# Patient Record
Sex: FEMALE | Race: WHITE | NOT HISPANIC OR LATINO | Employment: OTHER | URBAN - METROPOLITAN AREA
[De-identification: names, ages, dates, MRNs, and addresses within clinical notes are randomized per-mention and may not be internally consistent; named-entity substitution may affect disease eponyms.]

---

## 2017-03-07 PROBLEM — E03.4 HYPOTHYROIDISM DUE TO ACQUIRED ATROPHY OF THYROID: Status: ACTIVE | Noted: 2017-03-07

## 2017-03-07 PROBLEM — M85.80 OSTEOPENIA: Status: ACTIVE | Noted: 2017-03-07

## 2019-06-13 PROBLEM — E66.811 CLASS 1 OBESITY DUE TO EXCESS CALORIES WITH SERIOUS COMORBIDITY AND BODY MASS INDEX (BMI) OF 30.0 TO 30.9 IN ADULT: Status: ACTIVE | Noted: 2019-06-13

## 2019-06-13 PROBLEM — I10 ESSENTIAL HYPERTENSION: Status: ACTIVE | Noted: 2019-06-13

## 2019-06-13 PROBLEM — E66.09 CLASS 1 OBESITY DUE TO EXCESS CALORIES WITH SERIOUS COMORBIDITY AND BODY MASS INDEX (BMI) OF 30.0 TO 30.9 IN ADULT: Status: ACTIVE | Noted: 2019-06-13

## 2021-08-03 PROBLEM — D61.818 PANCYTOPENIA: Status: ACTIVE | Noted: 2021-08-03

## 2021-08-25 ENCOUNTER — OFFICE VISIT (OUTPATIENT)
Dept: URGENT CARE | Facility: CLINIC | Age: 71
End: 2021-08-25
Payer: MEDICARE

## 2021-08-25 VITALS
TEMPERATURE: 98 F | HEIGHT: 68 IN | DIASTOLIC BLOOD PRESSURE: 77 MMHG | HEART RATE: 54 BPM | BODY MASS INDEX: 31.32 KG/M2 | OXYGEN SATURATION: 98 % | SYSTOLIC BLOOD PRESSURE: 140 MMHG | RESPIRATION RATE: 18 BRPM | WEIGHT: 206.63 LBS

## 2021-08-25 DIAGNOSIS — M25.531 RIGHT WRIST PAIN: Primary | ICD-10-CM

## 2021-08-25 PROCEDURE — 3077F PR MOST RECENT SYSTOLIC BLOOD PRESSURE >= 140 MM HG: ICD-10-PCS | Mod: CPTII,S$GLB,, | Performed by: NURSE PRACTITIONER

## 2021-08-25 PROCEDURE — 3008F PR BODY MASS INDEX (BMI) DOCUMENTED: ICD-10-PCS | Mod: CPTII,S$GLB,, | Performed by: NURSE PRACTITIONER

## 2021-08-25 PROCEDURE — 3078F PR MOST RECENT DIASTOLIC BLOOD PRESSURE < 80 MM HG: ICD-10-PCS | Mod: CPTII,S$GLB,, | Performed by: NURSE PRACTITIONER

## 2021-08-25 PROCEDURE — 3008F BODY MASS INDEX DOCD: CPT | Mod: CPTII,S$GLB,, | Performed by: NURSE PRACTITIONER

## 2021-08-25 PROCEDURE — 3078F DIAST BP <80 MM HG: CPT | Mod: CPTII,S$GLB,, | Performed by: NURSE PRACTITIONER

## 2021-08-25 PROCEDURE — 1159F MED LIST DOCD IN RCRD: CPT | Mod: CPTII,S$GLB,, | Performed by: NURSE PRACTITIONER

## 2021-08-25 PROCEDURE — 73110 XR WRIST COMPLETE 3 VIEWS RIGHT: ICD-10-PCS | Mod: RT,S$GLB,, | Performed by: RADIOLOGY

## 2021-08-25 PROCEDURE — 3077F SYST BP >= 140 MM HG: CPT | Mod: CPTII,S$GLB,, | Performed by: NURSE PRACTITIONER

## 2021-08-25 PROCEDURE — 73110 X-RAY EXAM OF WRIST: CPT | Mod: RT,S$GLB,, | Performed by: RADIOLOGY

## 2021-08-25 PROCEDURE — 99203 OFFICE O/P NEW LOW 30 MIN: CPT | Mod: S$GLB,,, | Performed by: NURSE PRACTITIONER

## 2021-08-25 PROCEDURE — 1159F PR MEDICATION LIST DOCUMENTED IN MEDICAL RECORD: ICD-10-PCS | Mod: CPTII,S$GLB,, | Performed by: NURSE PRACTITIONER

## 2021-08-25 PROCEDURE — 99203 PR OFFICE/OUTPT VISIT, NEW, LEVL III, 30-44 MIN: ICD-10-PCS | Mod: S$GLB,,, | Performed by: NURSE PRACTITIONER

## 2021-08-25 RX ORDER — MELOXICAM 15 MG/1
15 TABLET ORAL DAILY
COMMUNITY
End: 2023-09-18 | Stop reason: SDUPTHER

## 2021-12-31 ENCOUNTER — HOSPITAL ENCOUNTER (EMERGENCY)
Facility: HOSPITAL | Age: 71
Discharge: HOME | End: 2021-12-31
Attending: EMERGENCY MEDICINE
Payer: MEDICARE

## 2021-12-31 VITALS
WEIGHT: 200 LBS | BODY MASS INDEX: 30.31 KG/M2 | RESPIRATION RATE: 29 BRPM | HEART RATE: 56 BPM | OXYGEN SATURATION: 97 % | SYSTOLIC BLOOD PRESSURE: 148 MMHG | TEMPERATURE: 98 F | DIASTOLIC BLOOD PRESSURE: 66 MMHG | HEIGHT: 68 IN

## 2021-12-31 DIAGNOSIS — R42 DIZZINESS: Primary | ICD-10-CM

## 2021-12-31 DIAGNOSIS — N39.0 URINARY TRACT INFECTION WITHOUT HEMATURIA, SITE UNSPECIFIED: ICD-10-CM

## 2021-12-31 LAB
ALBUMIN SERPL BCP-MCNC: 3.8 G/DL (ref 3.5–5.2)
ALP SERPL-CCNC: 71 U/L (ref 55–135)
ALT SERPL W/O P-5'-P-CCNC: 20 U/L (ref 10–44)
ANION GAP SERPL CALC-SCNC: 10 MMOL/L (ref 8–16)
AST SERPL-CCNC: 29 U/L (ref 10–40)
BACTERIA #/AREA URNS HPF: ABNORMAL /HPF
BASOPHILS # BLD AUTO: 0.05 K/UL (ref 0–0.2)
BASOPHILS NFR BLD: 0.9 % (ref 0–1.9)
BILIRUB SERPL-MCNC: 0.8 MG/DL (ref 0.1–1)
BILIRUB UR QL STRIP: NEGATIVE
BUN SERPL-MCNC: 12 MG/DL (ref 8–23)
CALCIUM SERPL-MCNC: 9 MG/DL (ref 8.7–10.5)
CHLORIDE SERPL-SCNC: 103 MMOL/L (ref 95–110)
CLARITY UR: CLEAR
CO2 SERPL-SCNC: 25 MMOL/L (ref 23–29)
COLOR UR: YELLOW
CREAT SERPL-MCNC: 0.9 MG/DL (ref 0.5–1.4)
DIFFERENTIAL METHOD: ABNORMAL
EOSINOPHIL # BLD AUTO: 0.1 K/UL (ref 0–0.5)
EOSINOPHIL NFR BLD: 2.4 % (ref 0–8)
ERYTHROCYTE [DISTWIDTH] IN BLOOD BY AUTOMATED COUNT: 14.8 % (ref 11.5–14.5)
EST. GFR  (AFRICAN AMERICAN): >60 ML/MIN/1.73 M^2
EST. GFR  (NON AFRICAN AMERICAN): >60 ML/MIN/1.73 M^2
GLUCOSE SERPL-MCNC: 94 MG/DL (ref 70–110)
GLUCOSE UR QL STRIP: NEGATIVE
HCT VFR BLD AUTO: 36.2 % (ref 37–48.5)
HGB BLD-MCNC: 11.2 G/DL (ref 12–16)
HGB UR QL STRIP: NEGATIVE
HYALINE CASTS #/AREA URNS LPF: 13 /LPF
IMM GRANULOCYTES # BLD AUTO: 0.01 K/UL (ref 0–0.04)
IMM GRANULOCYTES NFR BLD AUTO: 0.2 % (ref 0–0.5)
KETONES UR QL STRIP: NEGATIVE
LEUKOCYTE ESTERASE UR QL STRIP: ABNORMAL
LYMPHOCYTES # BLD AUTO: 2 K/UL (ref 1–4.8)
LYMPHOCYTES NFR BLD: 37.4 % (ref 18–48)
MCH RBC QN AUTO: 26.3 PG (ref 27–31)
MCHC RBC AUTO-ENTMCNC: 30.9 G/DL (ref 32–36)
MCV RBC AUTO: 85 FL (ref 82–98)
MICROSCOPIC COMMENT: ABNORMAL
MONOCYTES # BLD AUTO: 0.4 K/UL (ref 0.3–1)
MONOCYTES NFR BLD: 8.2 % (ref 4–15)
NEUTROPHILS # BLD AUTO: 2.7 K/UL (ref 1.8–7.7)
NEUTROPHILS NFR BLD: 50.9 % (ref 38–73)
NITRITE UR QL STRIP: NEGATIVE
NRBC BLD-RTO: 0 /100 WBC
PH UR STRIP: 8 [PH] (ref 5–8)
PLATELET # BLD AUTO: 238 K/UL (ref 150–450)
PMV BLD AUTO: 9.3 FL (ref 9.2–12.9)
POTASSIUM SERPL-SCNC: 4 MMOL/L (ref 3.5–5.1)
PROT SERPL-MCNC: 6.8 G/DL (ref 6–8.4)
PROT UR QL STRIP: NEGATIVE
RBC # BLD AUTO: 4.26 M/UL (ref 4–5.4)
RBC #/AREA URNS HPF: 0 /HPF (ref 0–4)
SARS-COV-2 RDRP RESP QL NAA+PROBE: NEGATIVE
SODIUM SERPL-SCNC: 138 MMOL/L (ref 136–145)
SP GR UR STRIP: 1.01 (ref 1–1.03)
SQUAMOUS #/AREA URNS HPF: 9 /HPF
TROPONIN I SERPL DL<=0.01 NG/ML-MCNC: <0.03 NG/ML
TSH SERPL DL<=0.005 MIU/L-ACNC: 0.78 UIU/ML (ref 0.34–5.6)
URN SPEC COLLECT METH UR: ABNORMAL
UROBILINOGEN UR STRIP-ACNC: NEGATIVE EU/DL
WBC # BLD AUTO: 5.35 K/UL (ref 3.9–12.7)
WBC #/AREA URNS HPF: 14 /HPF (ref 0–5)

## 2021-12-31 PROCEDURE — 25000003 PHARM REV CODE 250: Performed by: EMERGENCY MEDICINE

## 2021-12-31 PROCEDURE — 36000 PLACE NEEDLE IN VEIN: CPT

## 2021-12-31 PROCEDURE — 80053 COMPREHEN METABOLIC PANEL: CPT | Performed by: EMERGENCY MEDICINE

## 2021-12-31 PROCEDURE — 85025 COMPLETE CBC W/AUTO DIFF WBC: CPT | Performed by: EMERGENCY MEDICINE

## 2021-12-31 PROCEDURE — 84443 ASSAY THYROID STIM HORMONE: CPT | Performed by: EMERGENCY MEDICINE

## 2021-12-31 PROCEDURE — U0002 COVID-19 LAB TEST NON-CDC: HCPCS | Performed by: EMERGENCY MEDICINE

## 2021-12-31 PROCEDURE — 87086 URINE CULTURE/COLONY COUNT: CPT | Performed by: EMERGENCY MEDICINE

## 2021-12-31 PROCEDURE — 99284 EMERGENCY DEPT VISIT MOD MDM: CPT | Mod: 25

## 2021-12-31 PROCEDURE — 84484 ASSAY OF TROPONIN QUANT: CPT | Performed by: EMERGENCY MEDICINE

## 2021-12-31 PROCEDURE — 81001 URINALYSIS AUTO W/SCOPE: CPT | Performed by: EMERGENCY MEDICINE

## 2021-12-31 RX ORDER — MECLIZINE HCL 12.5 MG 12.5 MG/1
25 TABLET ORAL
Status: COMPLETED | OUTPATIENT
Start: 2021-12-31 | End: 2021-12-31

## 2021-12-31 RX ORDER — AMOXICILLIN AND CLAVULANATE POTASSIUM 875; 125 MG/1; MG/1
1 TABLET, FILM COATED ORAL 2 TIMES DAILY
Qty: 14 TABLET | Refills: 0 | Status: SHIPPED | OUTPATIENT
Start: 2021-12-31 | End: 2022-08-29

## 2021-12-31 RX ORDER — MECLIZINE HYDROCHLORIDE 25 MG/1
25 TABLET ORAL 3 TIMES DAILY PRN
Qty: 20 TABLET | Refills: 0 | Status: SHIPPED | OUTPATIENT
Start: 2021-12-31 | End: 2023-09-18

## 2021-12-31 RX ORDER — ACETAMINOPHEN 500 MG
5000 TABLET ORAL DAILY
COMMUNITY

## 2021-12-31 RX ADMIN — SODIUM CHLORIDE 1000 ML: 0.9 INJECTION, SOLUTION INTRAVENOUS at 04:12

## 2021-12-31 RX ADMIN — MECLIZINE HYDROCHLORIDE 25 MG: 12.5 TABLET ORAL at 04:12

## 2021-12-31 NOTE — ED PROVIDER NOTES
Encounter Date: 12/31/2021       History     Chief Complaint   Patient presents with    Dizziness     Dizziness today w/ nausea.       71-year-old female presents complaining of dizziness patient reports that she has had intermittent dizziness starting today patient reports that dizziness is when standing she reports no dizziness while sitting down patient reports no dizziness when turning her head patient reports no current dizziness.  Patient denies weakness numbness headache patient denies ear pain patient has no other acute complaints at this time        Review of patient's allergies indicates:  No Known Allergies  Past Medical History:   Diagnosis Date    Thyroid nodule      Past Surgical History:   Procedure Laterality Date    GASTRIC BYPASS      HYSTERECTOMY  1984    partial    THYROIDECTOMY, PARTIAL       Family History   Problem Relation Age of Onset    Cancer Mother         breast    Breast cancer Mother 73    Cancer Father     Cirrhosis Father     Cancer Sister         lymph node    Heart disease Brother      Social History     Tobacco Use    Smoking status: Never Smoker    Smokeless tobacco: Never Used   Substance Use Topics    Drug use: No     Review of Systems   Constitutional: Negative for fever.   HENT: Negative for congestion, rhinorrhea, sore throat and trouble swallowing.    Eyes: Negative for visual disturbance.   Respiratory: Negative for cough, chest tightness, shortness of breath and wheezing.    Cardiovascular: Negative for chest pain, palpitations and leg swelling.   Gastrointestinal: Negative for abdominal distention, abdominal pain, constipation, diarrhea, nausea and vomiting.   Genitourinary: Negative for difficulty urinating, dysuria, flank pain and frequency.   Musculoskeletal: Negative for arthralgias, back pain, joint swelling and neck pain.   Skin: Negative for color change and rash.   Neurological: Positive for dizziness. Negative for syncope, speech difficulty,  weakness, numbness and headaches.   All other systems reviewed and are negative.      Physical Exam     Initial Vitals [12/31/21 1431]   BP Pulse Resp Temp SpO2   (!) 169/84 64 18 98 °F (36.7 °C) 98 %      MAP       --         Physical Exam    Nursing note and vitals reviewed.  Constitutional: She appears well-developed and well-nourished. She is not diaphoretic. No distress.   HENT:   Head: Normocephalic and atraumatic.   Right Ear: External ear normal.   Left Ear: External ear normal.   Nose: Nose normal.   Mouth/Throat: Oropharynx is clear and moist. No oropharyngeal exudate.   Patient has extensive cerumen in bilateral ears   Eyes: Conjunctivae and EOM are normal. Pupils are equal, round, and reactive to light. Right eye exhibits no discharge. Left eye exhibits no discharge. No scleral icterus.   Neck: Neck supple. No thyromegaly present. No tracheal deviation present. No JVD present.   Normal range of motion.  Cardiovascular: Normal rate, regular rhythm, normal heart sounds and intact distal pulses. Exam reveals no gallop and no friction rub.    No murmur heard.  Pulmonary/Chest: Breath sounds normal. No stridor. No respiratory distress. She has no wheezes. She has no rhonchi. She has no rales. She exhibits no tenderness.   Abdominal: Abdomen is soft. Bowel sounds are normal. She exhibits no distension and no mass. There is no abdominal tenderness. There is no rebound and no guarding.   Musculoskeletal:         General: No tenderness or edema. Normal range of motion.      Cervical back: Normal range of motion and neck supple.      Comments: No pronator drift, gait stable, strength 5/5 x 4, no gross neurosensory deficits, CN II-XII grossly intact.       Lymphadenopathy:     She has no cervical adenopathy.   Neurological: She is alert and oriented to person, place, and time. She has normal strength. No cranial nerve deficit or sensory deficit.   Skin: Skin is warm and dry. No rash and no abscess noted. No  erythema. No pallor.         ED Course   Procedures  Labs Reviewed   CBC W/ AUTO DIFFERENTIAL - Abnormal; Notable for the following components:       Result Value    Hemoglobin 11.2 (*)     Hematocrit 36.2 (*)     MCH 26.3 (*)     MCHC 30.9 (*)     RDW 14.8 (*)     All other components within normal limits   URINALYSIS, REFLEX TO URINE CULTURE - Abnormal; Notable for the following components:    Leukocytes, UA 2+ (*)     All other components within normal limits    Narrative:     Specimen Source->Urine   URINALYSIS MICROSCOPIC - Abnormal; Notable for the following components:    WBC, UA 14 (*)     Hyaline Casts, UA 13 (*)     All other components within normal limits    Narrative:     Specimen Source->Urine   CULTURE, URINE   SARS-COV-2 RNA AMPLIFICATION, QUAL   COMPREHENSIVE METABOLIC PANEL   TSH   TROPONIN I          Imaging Results          X-Ray Chest AP Portable (Final result)  Result time 12/31/21 16:20:48    Final result by Estefany Diego MD (12/31/21 16:20:48)                 Narrative:    XR CHEST 1 VIEW    CLINICAL HISTORY:  71 years Female dizziness    COMPARISON: None    FINDINGS: Cardiomediastinal silhouette is within normal limits. Lungs are normally expanded with no airspace consolidation. No pleural effusion or pneumothorax. No acute osseous abnormality.    IMPRESSION: No acute pulmonary process.    Electronically signed by:  Estefany Diego MD  12/31/2021 4:20 PM CST Workstation: [id number]                             CT Head Without Contrast (Final result)  Result time 12/31/21 16:11:26    Final result by Estefany Diego MD (12/31/21 16:11:26)                 Narrative:    CMS MANDATED QUALITY DATA - CT RADIATION  436    All CT scans at this facility utilize dose modulation, iterative reconstruction, and/or weight based dosing when appropriate to reduce radiation dose to as low as reasonably achievable.  CT head without contrast    Clinical data: Dizziness    FINDINGS: Noninfusion  images were obtained from the skull base to the vertex. There is no intracranial mass, hemorrhage, or midline shift. Ventricles and sulci are mildly prominent. There are no pathologic extra-axial fluid collections. There is no evidence of cortical ischemic change. Changes of mild chronic microvascular white matter disease are noted. Cerebellum and brainstem are normal. The calvarium is intact.    IMPRESSION:    1. No acute intracranial abnormalities. Chronic findings as discussed above.    Electronically signed by:  Estefany Diego MD  12/31/2021 4:11 PM [facility] Workstation: [id number]                               Medications   carbamide peroxide 6.5 % otic solution 5 drop (has no administration in time range)   sodium chloride 0.9% bolus 1,000 mL (0 mLs Intravenous Stopped 12/31/21 1647)   meclizine tablet 25 mg (25 mg Oral Given 12/31/21 1638)     Medical Decision Making:   History:   Old Medical Records: I decided to obtain old medical records.  Initial Assessment:   Emergent evaluation of a 71-year-old female presenting with intermittent dizziness, dizziness is upon standing and dizziness not currently present, differential includes dehydration, electrolyte abnormality, endocrine dysfunction, ACS, effect from cerumen impaction            Attending Attestation:             Attending ED Notes:   Patient's imaging shows no acute abnormalities patient's labs are within normal limits patient had large amounts of cerumen removed from her ears and reports that dizziness has decreased markedly patient ambulated to restroom without complaint and reports that she is feeling improved.  Patient offered observational stay in hospital but declined stating that she was feeling better.  Patient will be started on a course of Antivert patient is referred to ENT for further evaluation and management as well as Neurology patient is advised to return immediately to the ER for any worsening or for any further concerns.                Clinical Impression:   Final diagnoses:  [R42] Dizziness (Primary)  [N39.0] Urinary tract infection without hematuria, site unspecified          ED Disposition Condition    Discharge Stable        ED Prescriptions     Medication Sig Dispense Start Date End Date Auth. Provider    meclizine (ANTIVERT) 25 mg tablet Take 1 tablet (25 mg total) by mouth 3 (three) times daily as needed. 20 tablet 12/31/2021  Ronen Shannon MD    amoxicillin-clavulanate 875-125mg (AUGMENTIN) 875-125 mg per tablet Take 1 tablet by mouth 2 (two) times daily. 14 tablet 12/31/2021  Ronen Shannon MD        Follow-up Information     Follow up With Specialties Details Why Contact Info Additional Information    [facility] - Emergency Dept Emergency Medicine  If symptoms worsen [address] [id number] [phone] 1st floor    Peng Tipton MD Otolaryngology Schedule an appointment as soon as possible for a visit in 2 days  [address]  [address]  [facility] [id number] [phone]       Nitin Brooke MD Vascular Neurology, Neurology Schedule an appointment as soon as possible for a visit in 2 days  [address]  [facility] [address]  [phone]              Ronen Shannon MD  12/31/21 2015

## 2021-12-31 NOTE — FIRST PROVIDER EVALUATION
Emergency Department TeleTriage Encounter Note      CHIEF COMPLAINT    Chief Complaint   Patient presents with    Dizziness     Dizziness today w/ nausea.         VITAL SIGNS   Initial Vitals [12/31/21 1431]   BP Pulse Resp Temp SpO2   (!) 169/84 64 18 98 °F (36.7 °C) 98 %      MAP       --            ALLERGIES    Review of patient's allergies indicates:  No Known Allergies    PROVIDER TRIAGE NOTE  This is a teletriage evaluation of a 71 y.o. female presenting to the ED complaining of dizziness. Patient reports dizziness with nausea that started today. Worst with movement. Symptoms are intermittent. Denies headache, numbness, tingling, or weakness.     Initial orders will be placed and care will be transferred to an alternate provider when patient is roomed for a full evaluation. Any additional orders and the final disposition will be determined by that provider.           ORDERS  Labs Reviewed - No data to display    ED Orders (720h ago, onward)    None            Virtual Visit Note: The provider triage portion of this emergency department evaluation and documentation was performed via [employer], a HIPAA-compliant telemedicine application, in concert with a tele-presenter in the room. A face to face patient evaluation with one of my colleagues will occur once the patient is placed in an emergency department room.      DISCLAIMER: This note was prepared with [employer] voice recognition transcription software. Garbled syntax, mangled pronouns, and other bizarre constructions may be attributed to that software system.

## 2022-01-02 LAB
BACTERIA UR CULT: NORMAL
BACTERIA UR CULT: NORMAL

## 2022-08-29 PROBLEM — E78.2 MIXED HYPERLIPIDEMIA: Status: ACTIVE | Noted: 2022-08-29

## 2022-08-29 PROBLEM — E55.9 VITAMIN D DEFICIENCY: Status: ACTIVE | Noted: 2022-08-29

## 2024-09-09 ENCOUNTER — TELEPHONE (OUTPATIENT)
Dept: FAMILY MEDICINE | Facility: CLINIC | Age: 74
End: 2024-09-09
Payer: MEDICARE

## 2024-09-09 NOTE — TELEPHONE ENCOUNTER
Called patient to remind her of her new patient appointment on 9/19/24. No answer but left a detailed message to bring all medication and supplement bottles.

## 2024-09-19 ENCOUNTER — OFFICE VISIT (OUTPATIENT)
Dept: FAMILY MEDICINE | Facility: CLINIC | Age: 74
End: 2024-09-19
Payer: MEDICARE

## 2024-09-19 VITALS
SYSTOLIC BLOOD PRESSURE: 130 MMHG | HEART RATE: 66 BPM | HEIGHT: 67 IN | DIASTOLIC BLOOD PRESSURE: 66 MMHG | WEIGHT: 184 LBS | BODY MASS INDEX: 28.88 KG/M2

## 2024-09-19 DIAGNOSIS — Z78.0 MENOPAUSE: ICD-10-CM

## 2024-09-19 DIAGNOSIS — M79.609 PARESTHESIA AND PAIN OF RIGHT EXTREMITY: ICD-10-CM

## 2024-09-19 DIAGNOSIS — E03.4 HYPOTHYROIDISM DUE TO ACQUIRED ATROPHY OF THYROID: ICD-10-CM

## 2024-09-19 DIAGNOSIS — R20.2 PARESTHESIA AND PAIN OF RIGHT EXTREMITY: ICD-10-CM

## 2024-09-19 DIAGNOSIS — R10.84 GENERALIZED ABDOMINAL PAIN: ICD-10-CM

## 2024-09-19 DIAGNOSIS — M81.0 OSTEOPOROSIS, UNSPECIFIED OSTEOPOROSIS TYPE, UNSPECIFIED PATHOLOGICAL FRACTURE PRESENCE: ICD-10-CM

## 2024-09-19 DIAGNOSIS — M62.838 MUSCLE SPASM OF BOTH LOWER LEGS: ICD-10-CM

## 2024-09-19 DIAGNOSIS — Z12.31 OTHER SCREENING MAMMOGRAM: ICD-10-CM

## 2024-09-19 DIAGNOSIS — I10 ESSENTIAL HYPERTENSION: Primary | ICD-10-CM

## 2024-09-19 DIAGNOSIS — Z79.899 LONG-TERM USE OF HIGH-RISK MEDICATION: ICD-10-CM

## 2024-09-19 PROCEDURE — 3078F DIAST BP <80 MM HG: CPT | Mod: CPTII,S$GLB,, | Performed by: FAMILY MEDICINE

## 2024-09-19 PROCEDURE — 3288F FALL RISK ASSESSMENT DOCD: CPT | Mod: CPTII,S$GLB,, | Performed by: FAMILY MEDICINE

## 2024-09-19 PROCEDURE — 1101F PT FALLS ASSESS-DOCD LE1/YR: CPT | Mod: CPTII,S$GLB,, | Performed by: FAMILY MEDICINE

## 2024-09-19 PROCEDURE — 3008F BODY MASS INDEX DOCD: CPT | Mod: CPTII,S$GLB,, | Performed by: FAMILY MEDICINE

## 2024-09-19 PROCEDURE — 99203 OFFICE O/P NEW LOW 30 MIN: CPT | Mod: S$GLB,,, | Performed by: FAMILY MEDICINE

## 2024-09-19 PROCEDURE — 3075F SYST BP GE 130 - 139MM HG: CPT | Mod: CPTII,S$GLB,, | Performed by: FAMILY MEDICINE

## 2024-09-19 PROCEDURE — 1159F MED LIST DOCD IN RCRD: CPT | Mod: CPTII,S$GLB,, | Performed by: FAMILY MEDICINE

## 2024-09-19 PROCEDURE — 1160F RVW MEDS BY RX/DR IN RCRD: CPT | Mod: CPTII,S$GLB,, | Performed by: FAMILY MEDICINE

## 2024-09-19 RX ORDER — PEDIATRIC MULTIVITAMIN NO.238
TABLET,CHEWABLE ORAL
COMMUNITY

## 2024-09-19 RX ORDER — CALCIUM CARB/VITAMIN D3/VIT K1 650MG-12.5
TABLET,CHEWABLE ORAL
COMMUNITY

## 2024-09-19 NOTE — PROGRESS NOTES
SUBJECTIVE:    Patient ID: Deisy Yeager is a 74 y.o. female.    Chief Complaint: Establish Care (New patient to establish care//brought med bottles//right hand aches and has pens and needles which is worsening//frequent muscle spasms in legs and feet//severe labor like cramping w/defecation//worsening sudden weakness,dizziness and sweating episodes. She feels as thought she will faint//declined colonoscopy//mammogram and dexa ordered//tc )    Pt here to get established.       Has been having right hand pain. Mostly happens at night, works at [employer] and as a .  Bothers her during the day at times. It will fall asleep while driving. Denies neck pain. Sometimes the pain goes up her arm to her elbow. Then entire hand goes to sleep and gets real cold. Wakes her up at night and has to massage it. Has been going on for months.  Has had an EMG in the past, and was told to wear a brace. Unsure if she was ever told that she had CTS. (Left handed) Not dropping things, carries her trays with her right hand.     Has severed spasms in the legs and feet about 3 times a month. They wake her up out of her sleep as well. Has to drink pickle juice. It lasts until she would drink it. She is staying a lot more hydrated now. Her legs do not bother her while she is at work. They do start to spasm in the feet when she is sitting too long in a chair or while driving.     Has bad stomach cramping with defecation. Has happened twice in the last month. Feels just like labor pains. Has not been constipated. Has hx of gastric bypass 2003. Never has had any problems with it, just can't overeat or eat dairy.     Has been having episodes where she feel weak, dizzy and sweating. Thinks her blood sugar was low because the symptoms went away when she drank some OJ.     BP is doing ok today. Denies CP/SOB/HA. Has never seen a cardiologist since 2003.    Sleeps ok other than her hands waking her up.     Has had a partial  thyroidectomy for a nodule in her thyroid.     Has been taking cholesterol medication for over a decade.     Has been taking vitamin B12 since she was gastric bypass. 4000IU daily.     Had hysterectomy for bleeding that got worse after BTL and uterus removed and no hx of fibroid. No hx of abnormal pap.   -----------------------------------------------------            No visits with results within 6 Month(s) from this visit.   Latest known visit with results is:   Office Visit on 09/18/2023   Component Date Value Ref Range Status    WBC 10/12/2023 3.6 (L)  3.8 - 10.8 Thousand/uL Final    RBC 10/12/2023 3.89  3.80 - 5.10 Million/uL Final    Hemoglobin 10/12/2023 11.5 (L)  11.7 - 15.5 g/dL Final    Hematocrit 10/12/2023 35.0  35.0 - 45.0 % Final    MCV 10/12/2023 90.0  80.0 - 100.0 fL Final    MCH 10/12/2023 29.6  27.0 - 33.0 pg Final    MCHC 10/12/2023 32.9  32.0 - 36.0 g/dL Final    RDW 10/12/2023 12.3  11.0 - 15.0 % Final    Platelets 10/12/2023 208  140 - 400 Thousand/uL Final    MPV 10/12/2023 9.9  7.5 - 12.5 fL Final    Neutrophils, Abs 10/12/2023 1,631  1,500 - 7,800 cells/uL Final    Lymph # 10/12/2023 1,472  850 - 3,900 cells/uL Final    Mono # 10/12/2023 328  200 - 950 cells/uL Final    Eos # 10/12/2023 130  15 - 500 cells/uL Final    Baso # 10/12/2023 40  0 - 200 cells/uL Final    Neutrophils Relative 10/12/2023 45.3  % Final    Lymph % 10/12/2023 40.9  % Final    Mono % 10/12/2023 9.1  % Final    Eosinophil % 10/12/2023 3.6  % Final    Basophil % 10/12/2023 1.1  % Final    Glucose 10/12/2023 84  65 - 99 mg/dL Final    BUN 10/12/2023 19  7 - 25 mg/dL Final    Creatinine 10/12/2023 0.74  0.60 - 1.00 mg/dL Final    eGFR 10/12/2023 86  > OR = 60 mL/min/1.73m2 Final    BUN/Creatinine Ratio 10/12/2023 SEE NOTE:  6 - 22 (calc) Final    Sodium 10/12/2023 140  135 - 146 mmol/L Final    Potassium 10/12/2023 4.0  3.5 - 5.3 mmol/L Final    Chloride 10/12/2023 106  98 - 110 mmol/L Final    CO2 10/12/2023 25  20 - 32  mmol/L Final    Calcium 10/12/2023 9.2  8.6 - 10.4 mg/dL Final    Total Protein 10/12/2023 6.1  6.1 - 8.1 g/dL Final    Albumin 10/12/2023 3.9  3.6 - 5.1 g/dL Final    Globulin, Total 10/12/2023 2.2  1.9 - 3.7 g/dL (calc) Final    Albumin/Globulin Ratio 10/12/2023 1.8  1.0 - 2.5 (calc) Final    Total Bilirubin 10/12/2023 0.6  0.2 - 1.2 mg/dL Final    Alkaline Phosphatase 10/12/2023 70  37 - 153 U/L Final    AST 10/12/2023 20  10 - 35 U/L Final    ALT 10/12/2023 14  6 - 29 U/L Final    Cholesterol 10/12/2023 138  <200 mg/dL Final    HDL 10/12/2023 79  > OR = 50 mg/dL Final    Triglycerides 10/12/2023 38  <150 mg/dL Final    LDL Cholesterol 10/12/2023 48  mg/dL (calc) Final    HDL/Cholesterol Ratio 10/12/2023 1.7  <5.0 (calc) Final    Non HDL Chol. (LDL+VLDL) 10/12/2023 59  <130 mg/dL (calc) Final    TSH 10/12/2023 1.22  0.40 - 4.50 mIU/L Final    Vitamin D, 25-OH, Total 10/12/2023 31  30 - 100 ng/mL Final    Vitamin B-12 10/12/2023 1,021  200 - 1,100 pg/mL Final       Past Medical History:   Diagnosis Date    Anxiety     Arthritis     Hyperlipidemia     Hypertension     Thyroid nodule      Social History     Socioeconomic History    Marital status:    Tobacco Use    Smoking status: Never    Smokeless tobacco: Never   Substance and Sexual Activity    Alcohol use: Yes     Alcohol/week: 1.0 standard drink of alcohol     Types: 1 Cans of beer per week     Comment: Occasionally    Drug use: No    Sexual activity: Yes     Partners: Male     Past Surgical History:   Procedure Laterality Date    GASTRIC BYPASS      HYSTERECTOMY  1984    partial    THYROIDECTOMY, PARTIAL      TUBAL LIGATION  07/14/1983     Family History   Problem Relation Name Age of Onset    Cancer Mother          breast    Breast cancer Mother  73    Cancer Father      Cirrhosis Father      Cancer Sister          lymph node    Heart disease Brother         Review of patient's allergies indicates:  No Known Allergies    Current Outpatient  Medications:     atenoloL (TENORMIN) 25 MG tablet, Take 1 tablet (25 mg total) by mouth once daily., Disp: 90 tablet, Rfl: 1    BIOTIN ORAL, Take 5,000 mcg by mouth once daily., Disp: , Rfl:     calcium-vitamin D3-vitamin K (VIACTIV) 650 mg-12.5 mcg-40 mcg Chew, Take by mouth., Disp: , Rfl:     cholecalciferol, vitamin D3, 125 mcg (5,000 unit) Tab, Take 5,000 Units by mouth once daily., Disp: , Rfl:     CYANOCOBALAMIN, VITAMIN B-12, ORAL, Take 6,000 mcg by mouth once daily., Disp: , Rfl:     hydroCHLOROthiazide (HYDRODIURIL) 25 MG tablet, Take 1 tablet (25 mg total) by mouth once daily., Disp: 90 tablet, Rfl: 1    levothyroxine (SYNTHROID) 112 MCG tablet, TAKE 1 TABLET BY MOUTH BEFORE BREAKFAST, Disp: 90 tablet, Rfl: 0    multivit with min-folic acid (WOMEN'S MULTIVITAMIN GUMMIES) 200 mcg Chew, Take by mouth., Disp: , Rfl:     rosuvastatin (CRESTOR) 10 MG tablet, Take 1 tablet (10 mg total) by mouth every evening., Disp: 90 tablet, Rfl: 1    meloxicam (MOBIC) 15 MG tablet, Take 1 tablet (15 mg total) by mouth once daily., Disp: 30 tablet, Rfl: 0    Review of Systems   Constitutional:  Negative for appetite change, fatigue, fever and unexpected weight change.   HENT: Negative.     Respiratory:  Negative for cough, chest tightness, shortness of breath and wheezing.    Cardiovascular:  Negative for chest pain and leg swelling.   Gastrointestinal:  Positive for abdominal pain. Negative for constipation, nausea and vomiting.        -heartburn   Genitourinary:  Negative for difficulty urinating, dysuria, frequency and urgency.   Musculoskeletal:  Negative for arthralgias, back pain, myalgias and neck pain.   Skin:  Negative for rash.   Allergic/Immunologic: Negative.    Neurological:  Positive for numbness (right hand). Negative for dizziness, weakness and headaches.   Hematological:  Does not bruise/bleed easily.   Psychiatric/Behavioral:  Negative for dysphoric mood, sleep disturbance and suicidal ideas. The patient is  "not nervous/anxious.    All other systems reviewed and are negative.         Objective:      Vitals:    09/19/24 1403   BP: 130/66   Pulse: 66   Weight: 83.5 kg (184 lb)   Height: 5' 7" (1.702 m)     Physical Exam  Vitals reviewed.   Constitutional:       General: She is not in acute distress.     Appearance: Normal appearance. She is well-developed.   HENT:      Head: Normocephalic and atraumatic.   Neck:      Thyroid: No thyromegaly.      Vascular: No carotid bruit.   Cardiovascular:      Rate and Rhythm: Normal rate and regular rhythm.      Heart sounds: Normal heart sounds. No murmur heard.     No friction rub.   Pulmonary:      Effort: Pulmonary effort is normal.      Breath sounds: Normal breath sounds. No wheezing or rales.   Abdominal:      General: Bowel sounds are normal. There is no distension.      Palpations: Abdomen is soft.      Tenderness: There is no abdominal tenderness.   Musculoskeletal:      Cervical back: Neck supple.   Lymphadenopathy:      Cervical: No cervical adenopathy.   Skin:     General: Skin is warm and dry.      Findings: No rash.   Neurological:      Mental Status: She is alert and oriented to person, place, and time.   Psychiatric:         Attention and Perception: She is attentive.         Speech: Speech normal.         Behavior: Behavior normal.         Thought Content: Thought content normal.         Judgment: Judgment normal.           Assessment:       1. Essential hypertension    2. Hypothyroidism due to acquired atrophy of thyroid    3. Paresthesia and pain of right extremity    4. Muscle spasm of both lower legs    5. Generalized abdominal pain    6. Menopause    7. Osteoporosis, unspecified osteoporosis type, unspecified pathological fracture presence    8. Other screening mammogram    9. Long-term use of high-risk medication         Plan:       Essential hypertension  Comments:  Controlled. Will continue to monitor BP on current medication regimen  Orders:  -     " Magnesium; Future; Expected date: 09/19/2024  -     TSH w/reflex to FT4; Future; Expected date: 09/19/2024  -     Urinalysis, Reflex to Urine Culture Urine, Clean Catch; Future; Expected date: 09/19/2024  -     CBC Auto Differential; Future; Expected date: 09/19/2024  -     Lipid Panel; Future; Expected date: 09/19/2024  -     Comprehensive Metabolic Panel; Future; Expected date: 09/19/2024  -     Microalbumin/Creatinine Ratio, Urine; Future; Expected date: 09/19/2024    Hypothyroidism due to acquired atrophy of thyroid  Comments:  Chronic. Will repeat function labs.  Orders:  -     TSH w/reflex to FT4; Future; Expected date: 09/19/2024    Paresthesia and pain of right extremity  Comments:  Acute. Will order EMG to check for CTS, or neuropathies.  Orders:  -     EMG W/ ULTRASOUND AND NERVE CONDUCTION TEST 1 Extremity; Future    Muscle spasm of both lower legs  Comments:  Intermittent. Do not suspect PAD. Pt has elected to continue pickle juice instead of muslce relaxer.    Generalized abdominal pain  Comments:  Intermittent. Suspect constipation. Will get KUB. May need to see GI.  Orders:  -     X-Ray KUB; Future; Expected date: 09/19/2024    Menopause  -     DXA Bone Density Axial Skeleton 1 or more sites; Future; Expected date: 09/19/2024    Osteoporosis, unspecified osteoporosis type, unspecified pathological fracture presence  -     DXA Bone Density Axial Skeleton 1 or more sites; Future; Expected date: 09/19/2024    Other screening mammogram  -     Mammo Digital Screening Bilat w/ Curry; Future; Expected date: 09/19/2024    Long-term use of high-risk medication  -     Magnesium; Future; Expected date: 09/19/2024  -     TSH w/reflex to FT4; Future; Expected date: 09/19/2024  -     Urinalysis, Reflex to Urine Culture Urine, Clean Catch; Future; Expected date: 09/19/2024  -     CBC Auto Differential; Future; Expected date: 09/19/2024  -     Lipid Panel; Future; Expected date: 09/19/2024  -     Comprehensive  Metabolic Panel; Future; Expected date: 09/19/2024  -     Microalbumin/Creatinine Ratio, Urine; Future; Expected date: 09/19/2024  -     EKG 12-lead; Future    Labs and/or tests have been ordered for the evaluation/monitoring of acute/chronic conditions, to be done just before next visit.    Follow up in about 3 months (around 12/19/2024) for HTN, Hypothyroid, LABS.        9/29/2024 Blaine Blank

## 2024-09-23 DIAGNOSIS — M72.2 PLANTAR FASCIITIS, BILATERAL: ICD-10-CM

## 2024-09-23 RX ORDER — MELOXICAM 15 MG/1
15 TABLET ORAL DAILY
Qty: 30 TABLET | Refills: 0 | Status: SHIPPED | OUTPATIENT
Start: 2024-09-23

## 2024-09-23 NOTE — TELEPHONE ENCOUNTER
----- Message from Amber Vega sent at 9/23/2024 12:41 PM CDT -----  Refill for meloxicam. [facility] [address]. Pt #[phone]

## 2024-09-23 NOTE — TELEPHONE ENCOUNTER
The patient's prescription has been approved and sent to   [id number] - [place], [place] - [address]  [address]  [place] [address]  Phone: [phone] Fax: [phone]

## 2024-10-03 ENCOUNTER — HOSPITAL ENCOUNTER (OUTPATIENT)
Dept: RADIOLOGY | Facility: HOSPITAL | Age: 74
Discharge: HOME | End: 2024-10-03
Attending: FAMILY MEDICINE
Payer: MEDICARE

## 2024-10-03 DIAGNOSIS — R10.84 GENERALIZED ABDOMINAL PAIN: ICD-10-CM

## 2024-10-03 DIAGNOSIS — Z78.0 MENOPAUSE: ICD-10-CM

## 2024-10-03 DIAGNOSIS — M81.0 OSTEOPOROSIS, UNSPECIFIED OSTEOPOROSIS TYPE, UNSPECIFIED PATHOLOGICAL FRACTURE PRESENCE: ICD-10-CM

## 2024-10-03 DIAGNOSIS — Z12.31 OTHER SCREENING MAMMOGRAM: ICD-10-CM

## 2024-10-03 LAB
ALBUMIN SERPL-MCNC: 4 G/DL (ref 3.6–5.1)
ALBUMIN/CREAT UR: 4 MG/G CREAT
ALBUMIN/GLOB SERPL: 1.6 (CALC) (ref 1–2.5)
ALP SERPL-CCNC: 93 U/L (ref 37–153)
ALT SERPL-CCNC: 10 U/L (ref 6–29)
APPEARANCE UR: CLEAR
AST SERPL-CCNC: 19 U/L (ref 10–35)
BACTERIA #/AREA URNS HPF: ABNORMAL /HPF
BACTERIA UR CULT: ABNORMAL
BACTERIA UR CULT: ABNORMAL
BASOPHILS # BLD AUTO: 42 CELLS/UL (ref 0–200)
BASOPHILS NFR BLD AUTO: 0.9 %
BILIRUB SERPL-MCNC: 0.5 MG/DL (ref 0.2–1.2)
BILIRUB UR QL STRIP: NEGATIVE
BUN SERPL-MCNC: 17 MG/DL (ref 7–25)
BUN/CREAT SERPL: NORMAL (CALC) (ref 6–22)
CALCIUM SERPL-MCNC: 8.9 MG/DL (ref 8.6–10.4)
CHLORIDE SERPL-SCNC: 104 MMOL/L (ref 98–110)
CHOLEST SERPL-MCNC: 134 MG/DL
CHOLEST/HDLC SERPL: 2 (CALC)
CO2 SERPL-SCNC: 27 MMOL/L (ref 20–32)
COLOR UR: YELLOW
CREAT SERPL-MCNC: 0.89 MG/DL (ref 0.6–1)
CREAT UR-MCNC: 71 MG/DL (ref 20–275)
EGFR: 68 ML/MIN/1.73M2
EOSINOPHIL # BLD AUTO: 141 CELLS/UL (ref 15–500)
EOSINOPHIL NFR BLD AUTO: 3 %
ERYTHROCYTE [DISTWIDTH] IN BLOOD BY AUTOMATED COUNT: 13.8 % (ref 11–15)
GLOBULIN SER CALC-MCNC: 2.5 G/DL (CALC) (ref 1.9–3.7)
GLUCOSE SERPL-MCNC: 87 MG/DL (ref 65–99)
GLUCOSE UR QL STRIP: NEGATIVE
HCT VFR BLD AUTO: 32.9 % (ref 35–45)
HDLC SERPL-MCNC: 67 MG/DL
HGB BLD-MCNC: 9.7 G/DL (ref 11.7–15.5)
HGB UR QL STRIP: NEGATIVE
HYALINE CASTS #/AREA URNS LPF: ABNORMAL /LPF
KETONES UR QL STRIP: NEGATIVE
LDLC SERPL CALC-MCNC: 54 MG/DL (CALC)
LEUKOCYTE ESTERASE UR QL STRIP: ABNORMAL
LYMPHOCYTES # BLD AUTO: 1697 CELLS/UL (ref 850–3900)
LYMPHOCYTES NFR BLD AUTO: 36.1 %
MAGNESIUM SERPL-MCNC: 1.5 MG/DL (ref 1.5–2.5)
MCH RBC QN AUTO: 24.3 PG (ref 27–33)
MCHC RBC AUTO-ENTMCNC: 29.5 G/DL (ref 32–36)
MCV RBC AUTO: 82.5 FL (ref 80–100)
MICROALBUMIN UR-MCNC: 0.3 MG/DL
MONOCYTES # BLD AUTO: 432 CELLS/UL (ref 200–950)
MONOCYTES NFR BLD AUTO: 9.2 %
NEUTROPHILS # BLD AUTO: 2388 CELLS/UL (ref 1500–7800)
NEUTROPHILS NFR BLD AUTO: 50.8 %
NITRITE UR QL STRIP: NEGATIVE
NONHDLC SERPL-MCNC: 67 MG/DL (CALC)
PH UR STRIP: 7 [PH] (ref 5–8)
PLATELET # BLD AUTO: 257 THOUSAND/UL (ref 140–400)
PMV BLD REES-ECKER: 9.7 FL (ref 7.5–12.5)
POTASSIUM SERPL-SCNC: 4.1 MMOL/L (ref 3.5–5.3)
PROT SERPL-MCNC: 6.5 G/DL (ref 6.1–8.1)
PROT UR QL STRIP: NEGATIVE
RBC # BLD AUTO: 3.99 MILLION/UL (ref 3.8–5.1)
RBC #/AREA URNS HPF: ABNORMAL /HPF
SERVICE CMNT-IMP: ABNORMAL
SODIUM SERPL-SCNC: 139 MMOL/L (ref 135–146)
SP GR UR STRIP: 1.01 (ref 1–1.03)
SQUAMOUS #/AREA URNS HPF: ABNORMAL /HPF
T4 FREE SERPL-MCNC: 1.2 NG/DL (ref 0.8–1.8)
TRIGL SERPL-MCNC: 45 MG/DL
TSH SERPL-ACNC: 0.38 MIU/L (ref 0.4–4.5)
WBC # BLD AUTO: 4.7 THOUSAND/UL (ref 3.8–10.8)
WBC #/AREA URNS HPF: ABNORMAL /HPF

## 2024-10-03 PROCEDURE — 77063 BREAST TOMOSYNTHESIS BI: CPT | Mod: 26,,, | Performed by: RADIOLOGY

## 2024-10-03 PROCEDURE — 77080 DXA BONE DENSITY AXIAL: CPT | Mod: TC,PO

## 2024-10-03 PROCEDURE — 74018 RADEX ABDOMEN 1 VIEW: CPT | Mod: TC,PO

## 2024-10-03 PROCEDURE — 77067 SCR MAMMO BI INCL CAD: CPT | Mod: 26,,, | Performed by: RADIOLOGY

## 2024-10-03 PROCEDURE — 74018 RADEX ABDOMEN 1 VIEW: CPT | Mod: 26,,, | Performed by: RADIOLOGY

## 2024-10-03 PROCEDURE — 77067 SCR MAMMO BI INCL CAD: CPT | Mod: TC,PO

## 2024-10-03 PROCEDURE — 77080 DXA BONE DENSITY AXIAL: CPT | Mod: 26,,, | Performed by: RADIOLOGY

## 2024-10-04 ENCOUNTER — TELEPHONE (OUTPATIENT)
Dept: FAMILY MEDICINE | Facility: CLINIC | Age: 74
End: 2024-10-04
Payer: MEDICARE

## 2024-10-08 ENCOUNTER — TELEPHONE (OUTPATIENT)
Dept: FAMILY MEDICINE | Facility: CLINIC | Age: 74
End: 2024-10-08
Payer: MEDICARE

## 2024-10-08 NOTE — TELEPHONE ENCOUNTER
----- Message from Blaine Blank MD sent at 10/6/2024  9:04 PM CDT -----  XRAY NORMAL- Your Xray abdomen showed no acute abnormalities.

## 2024-10-08 NOTE — TELEPHONE ENCOUNTER
----- Message from Blaine Blank MD sent at 10/6/2024  8:06 PM CDT -----  Please let the patient know that her mammogram is shows no evidence of malignancy. To repeat in 1-2 years.

## 2024-10-08 NOTE — TELEPHONE ENCOUNTER
----- Message from Blaine Blank MD sent at 10/8/2024  3:46 PM CDT -----  1. Your blood count shows an mild anemia. You could be bleeding slowly from your rectum. Though you declined at your visit, it is very important that you consider doing a colonoscopy to discover source of bleeding. We need to obtain a B12, folate, and iron testing.   2. TSH level was abnormal.  I recommend to get repeat TSH level in 8 weeks.    3. Your urinalysis showed evidence of an possible infection. If you are having symptoms of UTI, can  send in an antibiotic to your pharmacy (ceftin 500mg po BID x 3 days .   4. Cholesterol, kidney and liver function, blood sugar are normal.     ##needs TSH reflex, vitamin B1/folate, Ferritin, Iron panel

## 2024-10-08 NOTE — TELEPHONE ENCOUNTER
----- Message from Blaine Blank MD sent at 10/6/2024  8:57 PM CDT -----  Let pt know that her DEXA bone scan is     1. osteopenia in the left hip and normal in the L spine.  2. If not already, she should continue citrical 600mg plus 400 IU D bid with meals and Weight bearing exercises as tolerated to maintain her bone strength and to help prevent further decreases in bone density in the future.  We can talk about possible prescription drug treatments at her upcoming appointment to help build her bones.

## 2024-10-08 NOTE — TELEPHONE ENCOUNTER
Spoke with pt verbatim per Dr. Blank. Pt voiced understanding. Pt wondering about blood work results. Informed her will get message to Dr. Blank to review results. She voiced understanding.

## 2024-10-09 ENCOUNTER — TELEPHONE (OUTPATIENT)
Dept: FAMILY MEDICINE | Facility: CLINIC | Age: 74
End: 2024-10-09
Payer: MEDICARE

## 2024-10-09 DIAGNOSIS — D64.9 ANEMIA, UNSPECIFIED TYPE: ICD-10-CM

## 2024-10-09 DIAGNOSIS — Z79.899 LONG-TERM USE OF HIGH-RISK MEDICATION: Primary | ICD-10-CM

## 2024-10-09 DIAGNOSIS — Z12.11 SCREENING FOR COLON CANCER: ICD-10-CM

## 2024-10-09 NOTE — TELEPHONE ENCOUNTER
Spoke to pt verbatim per Dr. Blank . Pt voiced  understanding.     Pt would like referral for colonoscopy.   Labs pended. Do you want he to to b12 folate, iron in 8 weeks with TSH?  Pt declines UTI symptoms/     Not sure what you want in iron panel.

## 2024-10-09 NOTE — TELEPHONE ENCOUNTER
Yes she can do all the labs at the same time in 8 weeks.    Referred to :  Stella Ng MD  [address] [place] [address]  Phone: [phone]  Fax: [phone]

## 2024-10-28 ENCOUNTER — TELEPHONE (OUTPATIENT)
Dept: PHYSICAL MEDICINE AND REHAB | Facility: CLINIC | Age: 74
End: 2024-10-28
Payer: MEDICARE

## 2024-12-03 DIAGNOSIS — R25.2 LEG CRAMPS: Primary | ICD-10-CM

## 2024-12-03 RX ORDER — BACLOFEN 10 MG/1
10 TABLET ORAL NIGHTLY
Qty: 30 TABLET | Refills: 1 | Status: SHIPPED | OUTPATIENT
Start: 2024-12-03 | End: 2025-12-03

## 2024-12-03 NOTE — TELEPHONE ENCOUNTER
Pt states she has been experiencing  leg cramps for a while pt reports Dr. Blank offered rx at her last appt in September, pt denied at the time. States she is ready for something to help with the pains. Pt aware Dr. Blank is out of the office.

## 2024-12-03 NOTE — TELEPHONE ENCOUNTER
----- Message from Amber sent at 12/3/2024  2:24 PM CST -----  Pt needs something sent in for her leg camps. [facility] [address]. Pt #[phone]

## 2024-12-03 NOTE — TELEPHONE ENCOUNTER
The patient's prescription has been approved and sent to   [id number] - [place], [place] - [address].  [address].  [place] [id number]  Phone: [phone] Fax: [phone]

## 2024-12-04 ENCOUNTER — TELEPHONE (OUTPATIENT)
Dept: FAMILY MEDICINE | Facility: CLINIC | Age: 74
End: 2024-12-04
Payer: MEDICARE

## 2024-12-04 NOTE — TELEPHONE ENCOUNTER
----- Message from Dave Paul sent at 12/4/2024  9:13 AM CST -----    ----- Message -----  From: Tasneem Eugene LPN  Sent: 12/4/2024  12:00 AM CST  To: Blaine Blank Staff    ----- Message from Blaine Blank MD sent at 10/8/2024  3:46 PM CDT -----  1. Your blood count shows an mild anemia. You could be bleeding slowly from your rectum. Though you declined at your visit, it is very important that you consider doing a colonoscopy to discover source of bleeding. We need to obtain a B12, folate, and iron testing.   2. TSH level was abnormal.  I recommend to get repeat TSH level in 8 weeks.    3. Your urinalysis showed evidence of an possible infection. If you are having symptoms of UTI, can  send in an antibiotic to your pharmacy (ceftin 500mg po BID x 3 days .   4. Cholesterol, kidney and liver function, blood sugar are normal.      ##needs TSH reflex, vitamin B1/folate, Ferritin, Iron panel in 8 weeks.

## 2024-12-04 NOTE — TELEPHONE ENCOUNTER
Spoke to patient that lab is due and she does not need to fast. Some orders were entered on 10/9, but they don't match up to below. Please re-enter all labs that Dr Blank would like if different orders are warranted. Patient is coming Friday am.

## 2024-12-09 ENCOUNTER — TELEPHONE (OUTPATIENT)
Dept: FAMILY MEDICINE | Facility: CLINIC | Age: 74
End: 2024-12-09
Payer: MEDICARE

## 2024-12-24 DIAGNOSIS — I10 ESSENTIAL HYPERTENSION: ICD-10-CM

## 2024-12-24 RX ORDER — HYDROCHLOROTHIAZIDE 25 MG/1
25 TABLET ORAL DAILY
Qty: 90 TABLET | Refills: 1 | Status: SHIPPED | OUTPATIENT
Start: 2024-12-24 | End: 2025-01-02 | Stop reason: SDUPTHER

## 2024-12-24 NOTE — TELEPHONE ENCOUNTER
The patient's prescription has been approved and sent to   [id number] - [place], [place] - [address].  [address].  [place] [id number]  Phone: [phone] Fax: [phone]

## 2024-12-24 NOTE — TELEPHONE ENCOUNTER
----- Message from Maria Elena sent at 12/24/2024  9:04 AM CST -----  Pt needs a refill on her swelling medication and lepothyroxin to be sent to [facility].    [phone]

## 2024-12-26 DIAGNOSIS — E03.4 HYPOTHYROIDISM DUE TO ACQUIRED ATROPHY OF THYROID: ICD-10-CM

## 2024-12-26 RX ORDER — LEVOTHYROXINE SODIUM 112 UG/1
112 TABLET ORAL
Qty: 90 TABLET | Refills: 0 | Status: SHIPPED | OUTPATIENT
Start: 2024-12-26

## 2024-12-26 NOTE — TELEPHONE ENCOUNTER
----- Message from Susie sent at 12/26/2024  2:14 PM CST -----  Refill Levothyroxine [facility]. Pt's # [phone]

## 2024-12-28 NOTE — PROGRESS NOTES
SUBJECTIVE:    Patient ID: Deisy Yeager is a 75 y.o. female.    Chief Complaint: Follow-up (3 mo f/u//kedar tmed bottles//amb ref to [facility] Ng//declined flu vac//tc)    Pt here to checkup on acute and chronic conditions.       Has been having bad heartburn 3-4 times. To someone else's protonix with relief. Has not been eating any different. It has now gone away on it's on.     BP is doing ok today. Denies CP/SOB/HA. Has never seen a cardiologist since 2003.        Has an appt for hands to get EMG done this month for the right hand pain. Denies neck pain. Sometimes the pain goes up her arm to her elbow. Then entire hand goes to sleep and gets real cold. Wakes her up at night and has to massage it. Has been going on for months. Unsure if she was ever told that she had CTS. (Left handed) Not dropping things, carries her trays with her right hand.     Has severe spasms in the legs and feet about 3 times a month.  The baclofen helps and makes her sleepy and seem to help.  Her legs do not bother her while she is at work. They do start to spasm in the feet when she is sitting too long in a chair or while driving.       Has had a partial thyroidectomy for a nodule in her thyroid. Not complaining of any symptoms      Has been taking vitamin B12 since she was gastric bypass (2003). 4000IU daily.       Had labs done: TSH 0.25, free T4 1.5, Vit B12 513, ferritin 5, iron 25    -----------------------------------------------------  Mammogram 9/2024  Had hysterectomy for bleeding that got worse after BTL and uterus removed and no hx of fibroid. No hx of abnormal pap.   Has made an appt with GI but had to cancel. ([place])            Telephone on 10/09/2024   Component Date Value Ref Range Status    TSH w/reflex to FT4 12/06/2024 0.25 (L)  0.40 - 4.50 mIU/L Final    T4, Free 12/06/2024 1.5  0.8 - 1.8 ng/dL Final    Vitamin B-12 12/06/2024 513  200 - 1,100 pg/mL Final    Ferritin 12/06/2024 5 (L)  16 - 288 ng/mL Final     Iron 12/06/2024 25 (L)  45 - 160 mcg/dL Final    TIBC 12/06/2024 379  250 - 450 mcg/dL (calc) Final    Iron Saturation 12/06/2024 7 (L)  16 - 45 % (calc) Final   Office Visit on 09/19/2024   Component Date Value Ref Range Status    Magnesium 10/01/2024 1.5  1.5 - 2.5 mg/dL Final    TSH w/reflex to FT4 10/01/2024 0.38 (L)  0.40 - 4.50 mIU/L Final    T4, Free 10/01/2024 1.2  0.8 - 1.8 ng/dL Final    Color, UA 10/01/2024 YELLOW  YELLOW Final    Appearance, UA 10/01/2024 CLEAR  CLEAR Final    Specific Ernest, UA 10/01/2024 1.014  1.001 - 1.035 Final    pH, UA 10/01/2024 7.0  5.0 - 8.0 Final    Glucose, UA 10/01/2024 NEGATIVE  NEGATIVE Final    Bilirubin, UA 10/01/2024 NEGATIVE  NEGATIVE Final    Ketones, UA 10/01/2024 NEGATIVE  NEGATIVE Final    Occult Blood UA 10/01/2024 NEGATIVE  NEGATIVE Final    Protein, UA 10/01/2024 NEGATIVE  NEGATIVE Final    Nitrite, UA 10/01/2024 NEGATIVE  NEGATIVE Final    Leukocytes, UA 10/01/2024 2+ (A)  NEGATIVE Final    WBC Casts, UA 10/01/2024 0-5  < OR = 5 /HPF Final    RBC Casts, UA 10/01/2024 NONE SEEN  < OR = 2 /HPF Final    Squam Epithel, UA 10/01/2024 6-10 (A)  < OR = 5 /HPF Final    Bacteria, UA 10/01/2024 FEW (A)  NONE SEEN /HPF Final    Hyaline Casts, UA 10/01/2024 NONE SEEN  NONE SEEN /LPF Final    Service Cmt: 10/01/2024 SEE COMMENT   Final    Reflexive Urine Culture 10/01/2024 SEE COMMENT   Final    Urine Culture, Routine 10/01/2024 SEE COMMENT (A)   Final    WBC 10/01/2024 4.7  3.8 - 10.8 Thousand/uL Final    RBC 10/01/2024 3.99  3.80 - 5.10 Million/uL Final    Hemoglobin 10/01/2024 9.7 (L)  11.7 - 15.5 g/dL Final    Hematocrit 10/01/2024 32.9 (L)  35.0 - 45.0 % Final    MCV 10/01/2024 82.5  80.0 - 100.0 fL Final    MCH 10/01/2024 24.3 (L)  27.0 - 33.0 pg Final    MCHC 10/01/2024 29.5 (L)  32.0 - 36.0 g/dL Final    RDW 10/01/2024 13.8  11.0 - 15.0 % Final    Platelets 10/01/2024 257  140 - 400 Thousand/uL Final    MPV 10/01/2024 9.7  7.5 - 12.5 fL Final    Neutrophils, Abs  10/01/2024 2,388  1,500 - 7,800 cells/uL Final    Lymph # 10/01/2024 1,697  850 - 3,900 cells/uL Final    Mono # 10/01/2024 432  200 - 950 cells/uL Final    Eos # 10/01/2024 141  15 - 500 cells/uL Final    Baso # 10/01/2024 42  0 - 200 cells/uL Final    Neutrophils Relative 10/01/2024 50.8  % Final    Lymph % 10/01/2024 36.1  % Final    Mono % 10/01/2024 9.2  % Final    Eosinophil % 10/01/2024 3.0  % Final    Basophil % 10/01/2024 0.9  % Final    Cholesterol 10/01/2024 134  <200 mg/dL Final    HDL 10/01/2024 67  > OR = 50 mg/dL Final    Triglycerides 10/01/2024 45  <150 mg/dL Final    LDL Cholesterol 10/01/2024 54  mg/dL (calc) Final    HDL/Cholesterol Ratio 10/01/2024 2.0  <5.0 (calc) Final    Non HDL Chol. (LDL+VLDL) 10/01/2024 67  <130 mg/dL (calc) Final    Glucose 10/01/2024 87  65 - 99 mg/dL Final    BUN 10/01/2024 17  7 - 25 mg/dL Final    Creatinine 10/01/2024 0.89  0.60 - 1.00 mg/dL Final    eGFR 10/01/2024 68  > OR = 60 mL/min/1.73m2 Final    BUN/Creatinine Ratio 10/01/2024 SEE NOTE:  6 - 22 (calc) Final    Sodium 10/01/2024 139  135 - 146 mmol/L Final    Potassium 10/01/2024 4.1  3.5 - 5.3 mmol/L Final    Chloride 10/01/2024 104  98 - 110 mmol/L Final    CO2 10/01/2024 27  20 - 32 mmol/L Final    Calcium 10/01/2024 8.9  8.6 - 10.4 mg/dL Final    Total Protein 10/01/2024 6.5  6.1 - 8.1 g/dL Final    Albumin 10/01/2024 4.0  3.6 - 5.1 g/dL Final    Globulin, Total 10/01/2024 2.5  1.9 - 3.7 g/dL (calc) Final    Albumin/Globulin Ratio 10/01/2024 1.6  1.0 - 2.5 (calc) Final    Total Bilirubin 10/01/2024 0.5  0.2 - 1.2 mg/dL Final    Alkaline Phosphatase 10/01/2024 93  37 - 153 U/L Final    AST 10/01/2024 19  10 - 35 U/L Final    ALT 10/01/2024 10  6 - 29 U/L Final    Creatinine, Urine 10/01/2024 71  20 - 275 mg/dL Final    Microalb, Ur 10/01/2024 0.3  See Note: mg/dL Final    Microalb/Creat Ratio 10/01/2024 4  <30 mg/g creat Final       Past Medical History:   Diagnosis Date    Anxiety     Arthritis      Hyperlipidemia     Hypertension     Thyroid nodule      Social History     Socioeconomic History    Marital status:    Tobacco Use    Smoking status: Never    Smokeless tobacco: Never   Substance and Sexual Activity    Alcohol use: Yes     Alcohol/week: 1.0 standard drink of alcohol     Types: 1 Cans of beer per week     Comment: Occasionally    Drug use: No    Sexual activity: Yes     Partners: Male     Past Surgical History:   Procedure Laterality Date    GASTRIC BYPASS      HYSTERECTOMY  1984    partial    THYROIDECTOMY, PARTIAL      TUBAL LIGATION  07/14/1983     Family History   Problem Relation Name Age of Onset    Cancer Mother          breast    Breast cancer Mother  73    Cancer Father      Cirrhosis Father      Cancer Sister          lymph node    Heart disease Brother         Review of patient's allergies indicates:  No Known Allergies    Current Outpatient Medications:     BIOTIN ORAL, Take 5,000 mcg by mouth once daily., Disp: , Rfl:     calcium-vitamin D3-vitamin K (VIACTIV) 650 mg-12.5 mcg-40 mcg Chew, Take by mouth., Disp: , Rfl:     calcium-vitamin D3-vitamin K 650 mg-12.5 mcg-40 mcg Chew, Take by mouth., Disp: , Rfl:     CYANOCOBALAMIN, VITAMIN B-12, ORAL, Take 6,000 mcg by mouth once daily., Disp: , Rfl:     multivit with min-folic acid (WOMEN'S MULTIVITAMIN GUMMIES) 200 mcg Chew, Take by mouth., Disp: , Rfl:     atenoloL (TENORMIN) 25 MG tablet, Take 1 tablet (25 mg total) by mouth once daily., Disp: 90 tablet, Rfl: 1    baclofen (LIORESAL) 10 MG tablet, Take 1 tablet (10 mg total) by mouth every evening., Disp: 90 tablet, Rfl: 1    cholecalciferol, vitamin D3, 125 mcg (5,000 unit) Tab, Take 5,000 Units by mouth once daily., Disp: , Rfl:     ferrous gluconate 236 mg (27 mg iron) Tab, Take 1 tablet by mouth Daily., Disp: 90 tablet, Rfl: 1    hydroCHLOROthiazide (HYDRODIURIL) 25 MG tablet, Take 1 tablet (25 mg total) by mouth once daily., Disp: 90 tablet, Rfl: 1    levothyroxine (SYNTHROID)  "112 MCG tablet, Take 1 tablet (112 mcg total) by mouth before breakfast., Disp: 90 tablet, Rfl: 1    meloxicam (MOBIC) 15 MG tablet, Take 1 tablet (15 mg total) by mouth once daily., Disp: 90 tablet, Rfl: 1    rosuvastatin (CRESTOR) 10 MG tablet, Take 1 tablet (10 mg total) by mouth every evening., Disp: 90 tablet, Rfl: 1    Review of Systems   Constitutional:  Negative for appetite change, fatigue, fever and unexpected weight change.   HENT: Negative.     Respiratory:  Negative for cough, chest tightness, shortness of breath and wheezing.    Cardiovascular:  Negative for chest pain and leg swelling.   Gastrointestinal:  Negative for abdominal pain, constipation, nausea and vomiting.        -heartburn   Genitourinary:  Negative for difficulty urinating, dysuria, frequency and urgency.   Musculoskeletal:  Negative for arthralgias, back pain, myalgias and neck pain.   Skin:  Negative for rash.   Allergic/Immunologic: Negative.    Neurological:  Positive for numbness (right hand). Negative for dizziness, weakness and headaches.   Hematological:  Does not bruise/bleed easily.   Psychiatric/Behavioral:  Negative for dysphoric mood, sleep disturbance and suicidal ideas. The patient is not nervous/anxious.    All other systems reviewed and are negative.         Objective:      Vitals:    01/02/25 0923   BP: 120/72   Pulse: (!) 54   SpO2: 97%   Weight: 80.3 kg (177 lb)   Height: 5' 7" (1.702 m)     Wt Readings from Last 4 Encounters:   01/02/25 80.3 kg (177 lb)   09/19/24 83.5 kg (184 lb)   12/07/23 82.6 kg (182 lb)   09/18/23 82.1 kg (181 lb)         Physical Exam  Vitals reviewed.   Constitutional:       General: She is not in acute distress.     Appearance: Normal appearance. She is well-developed.   HENT:      Head: Normocephalic and atraumatic.   Neck:      Thyroid: No thyromegaly.      Vascular: No carotid bruit.   Cardiovascular:      Rate and Rhythm: Normal rate and regular rhythm.      Heart sounds: Normal heart " sounds. No murmur heard.     No friction rub.   Pulmonary:      Effort: Pulmonary effort is normal.      Breath sounds: Normal breath sounds. No wheezing or rales.   Abdominal:      General: Bowel sounds are normal. There is no distension.      Palpations: Abdomen is soft.      Tenderness: There is no abdominal tenderness.   Musculoskeletal:      Cervical back: Neck supple.   Lymphadenopathy:      Cervical: No cervical adenopathy.   Skin:     General: Skin is warm and dry.      Findings: No rash.   Neurological:      Mental Status: She is alert and oriented to person, place, and time.   Psychiatric:         Attention and Perception: She is attentive.         Speech: Speech normal.         Behavior: Behavior normal.         Thought Content: Thought content normal.         Judgment: Judgment normal.           Assessment:       1. Essential hypertension    2. Mixed hyperlipidemia    3. Other iron deficiency anemia    4. Hypothyroidism due to acquired atrophy of thyroid    5. Leg cramps    6. Plantar fasciitis, bilateral    7. Long-term use of high-risk medication           Plan:       Essential hypertension  Comments:  Controlled. Will continue to monitor BP on current medication regimen  Orders:  -     atenoloL (TENORMIN) 25 MG tablet; Take 1 tablet (25 mg total) by mouth once daily.  Dispense: 90 tablet; Refill: 1  -     hydroCHLOROthiazide (HYDRODIURIL) 25 MG tablet; Take 1 tablet (25 mg total) by mouth once daily.  Dispense: 90 tablet; Refill: 1    Mixed hyperlipidemia  Comments:  Optimal. LDL 54. To continue on crestor.  Orders:  -     rosuvastatin (CRESTOR) 10 MG tablet; Take 1 tablet (10 mg total) by mouth every evening.  Dispense: 90 tablet; Refill: 1    Other iron deficiency anemia  Comments:  Chronic. Will start on iron supplementation.  Pt advised to start taking colace to help with constipation.  Pt advised to still get set up for Cscope  Orders:  -     ferrous gluconate 236 mg (27 mg iron) Tab; Take 1  tablet by mouth Daily.  Dispense: 90 tablet; Refill: 1  -     CBC W/ AUTO DIFFERENTIAL; Future; Expected date: 01/02/2025  -     FERRITIN; Future; Expected date: 01/02/2025    Hypothyroidism due to acquired atrophy of thyroid  Comments:  Asymptomatic. Will continue to monitor symptoms on current dose of levothyroxine  Orders:  -     levothyroxine (SYNTHROID) 112 MCG tablet; Take 1 tablet (112 mcg total) by mouth before breakfast.  Dispense: 90 tablet; Refill: 1  -     TSH w/reflex to FT4; Future; Expected date: 01/02/2025    Leg cramps  Comments:  Improved. Will continue to monitor symptoms on baclofen  Orders:  -     baclofen (LIORESAL) 10 MG tablet; Take 1 tablet (10 mg total) by mouth every evening.  Dispense: 90 tablet; Refill: 1    Plantar fasciitis, bilateral  Comments:  Controlled. Will continue to monitor symptoms on mobic.  Orders:  -     meloxicam (MOBIC) 15 MG tablet; Take 1 tablet (15 mg total) by mouth once daily.  Dispense: 90 tablet; Refill: 1    Long-term use of high-risk medication  -     CBC W/ AUTO DIFFERENTIAL; Future; Expected date: 01/02/2025  -     FERRITIN; Future; Expected date: 01/02/2025  -     TSH w/reflex to FT4; Future; Expected date: 01/02/2025      Other  Lab results discussed and reviewed with patient.  Labs and/or tests have been ordered for the evaluation/monitoring of acute/chronic conditions, to be done just before next visit.    Follow up in about 4 months (around 5/2/2025) for HTN, anemia, cramps.        1/12/2025 Blaine Blank

## 2025-01-02 ENCOUNTER — OFFICE VISIT (OUTPATIENT)
Dept: FAMILY MEDICINE | Facility: CLINIC | Age: 75
End: 2025-01-02
Payer: MEDICARE

## 2025-01-02 VITALS
DIASTOLIC BLOOD PRESSURE: 72 MMHG | HEIGHT: 67 IN | HEART RATE: 54 BPM | BODY MASS INDEX: 27.78 KG/M2 | SYSTOLIC BLOOD PRESSURE: 120 MMHG | OXYGEN SATURATION: 97 % | WEIGHT: 177 LBS

## 2025-01-02 DIAGNOSIS — D50.8 OTHER IRON DEFICIENCY ANEMIA: ICD-10-CM

## 2025-01-02 DIAGNOSIS — M72.2 PLANTAR FASCIITIS, BILATERAL: ICD-10-CM

## 2025-01-02 DIAGNOSIS — R25.2 LEG CRAMPS: ICD-10-CM

## 2025-01-02 DIAGNOSIS — I10 ESSENTIAL HYPERTENSION: Primary | ICD-10-CM

## 2025-01-02 DIAGNOSIS — E78.2 MIXED HYPERLIPIDEMIA: ICD-10-CM

## 2025-01-02 DIAGNOSIS — Z79.899 LONG-TERM USE OF HIGH-RISK MEDICATION: ICD-10-CM

## 2025-01-02 DIAGNOSIS — E03.4 HYPOTHYROIDISM DUE TO ACQUIRED ATROPHY OF THYROID: ICD-10-CM

## 2025-01-02 PROBLEM — D61.818 PANCYTOPENIA: Status: RESOLVED | Noted: 2021-08-03 | Resolved: 2025-01-02

## 2025-01-02 PROCEDURE — 1101F PT FALLS ASSESS-DOCD LE1/YR: CPT | Mod: CPTII,S$GLB,, | Performed by: FAMILY MEDICINE

## 2025-01-02 PROCEDURE — 3008F BODY MASS INDEX DOCD: CPT | Mod: CPTII,S$GLB,, | Performed by: FAMILY MEDICINE

## 2025-01-02 PROCEDURE — 1160F RVW MEDS BY RX/DR IN RCRD: CPT | Mod: CPTII,S$GLB,, | Performed by: FAMILY MEDICINE

## 2025-01-02 PROCEDURE — 3078F DIAST BP <80 MM HG: CPT | Mod: CPTII,S$GLB,, | Performed by: FAMILY MEDICINE

## 2025-01-02 PROCEDURE — 3288F FALL RISK ASSESSMENT DOCD: CPT | Mod: CPTII,S$GLB,, | Performed by: FAMILY MEDICINE

## 2025-01-02 PROCEDURE — 3074F SYST BP LT 130 MM HG: CPT | Mod: CPTII,S$GLB,, | Performed by: FAMILY MEDICINE

## 2025-01-02 PROCEDURE — 1159F MED LIST DOCD IN RCRD: CPT | Mod: CPTII,S$GLB,, | Performed by: FAMILY MEDICINE

## 2025-01-02 PROCEDURE — 99214 OFFICE O/P EST MOD 30 MIN: CPT | Mod: S$GLB,,, | Performed by: FAMILY MEDICINE

## 2025-01-02 RX ORDER — GLUC/MSM/COLGN2/HYAL/ANTIARTH3 375-375-20
1 TABLET ORAL DAILY
Qty: 90 TABLET | Refills: 1 | Status: SHIPPED | OUTPATIENT
Start: 2025-01-02

## 2025-01-02 RX ORDER — LEVOTHYROXINE SODIUM 112 UG/1
112 TABLET ORAL
Qty: 90 TABLET | Refills: 1 | Status: SHIPPED | OUTPATIENT
Start: 2025-01-02

## 2025-01-02 RX ORDER — ATENOLOL 25 MG/1
25 TABLET ORAL DAILY
Qty: 90 TABLET | Refills: 1 | Status: SHIPPED | OUTPATIENT
Start: 2025-01-02

## 2025-01-02 RX ORDER — MELOXICAM 15 MG/1
15 TABLET ORAL DAILY
Qty: 90 TABLET | Refills: 1 | Status: SHIPPED | OUTPATIENT
Start: 2025-01-02

## 2025-01-02 RX ORDER — BACLOFEN 10 MG/1
10 TABLET ORAL NIGHTLY
Qty: 90 TABLET | Refills: 1 | Status: SHIPPED | OUTPATIENT
Start: 2025-01-02

## 2025-01-02 RX ORDER — ROSUVASTATIN CALCIUM 10 MG/1
10 TABLET, COATED ORAL NIGHTLY
Qty: 90 TABLET | Refills: 1 | Status: SHIPPED | OUTPATIENT
Start: 2025-01-02

## 2025-01-02 RX ORDER — HYDROCHLOROTHIAZIDE 25 MG/1
25 TABLET ORAL DAILY
Qty: 90 TABLET | Refills: 1 | Status: SHIPPED | OUTPATIENT
Start: 2025-01-02

## 2025-01-02 RX ORDER — CALCIUM CARB/VITAMIN D3/VIT K1 650MG-12.5
TABLET,CHEWABLE ORAL
COMMUNITY

## 2025-01-16 ENCOUNTER — OFFICE VISIT (OUTPATIENT)
Dept: PHYSICAL MEDICINE AND REHAB | Facility: CLINIC | Age: 75
End: 2025-01-16
Payer: MEDICARE

## 2025-01-16 DIAGNOSIS — G56.01 CARPAL TUNNEL SYNDROME OF RIGHT WRIST: Primary | ICD-10-CM

## 2025-01-16 DIAGNOSIS — M79.609 PARESTHESIA AND PAIN OF RIGHT EXTREMITY: ICD-10-CM

## 2025-01-16 DIAGNOSIS — R20.2 PARESTHESIA AND PAIN OF RIGHT EXTREMITY: ICD-10-CM

## 2025-01-16 PROCEDURE — 99499 UNLISTED E&M SERVICE: CPT | Mod: S$GLB,,, | Performed by: STUDENT IN AN ORGANIZED HEALTH CARE EDUCATION/TRAINING PROGRAM

## 2025-01-16 PROCEDURE — 95886 MUSC TEST DONE W/N TEST COMP: CPT | Mod: S$GLB,,, | Performed by: STUDENT IN AN ORGANIZED HEALTH CARE EDUCATION/TRAINING PROGRAM

## 2025-01-16 PROCEDURE — 95909 NRV CNDJ TST 5-6 STUDIES: CPT | Mod: S$GLB,,, | Performed by: STUDENT IN AN ORGANIZED HEALTH CARE EDUCATION/TRAINING PROGRAM

## 2025-01-16 NOTE — PROGRESS NOTES
[facility]  [address] [facility]  [place] [address]       Full Name: Deisy Yeager Gender: Female  MRN: [id number]  YOB: 1950  History: Right hand n/t for the past several years with worsening intensity.   Visit Date: 1/16/2025 1:22 PM  Age: 75 Years  Examining Physician: Junior Mercado MD   Referring Physician:  Blaine Blank MD      Sensory NCS      Nerve / Sites Rec. Site Onset Lat Peak Lat NP Amp PP Amp Segments Distance Velocity     ms ms µV µV  cm m/s   R Median - Digit II (Antidromic)      Wrist Dig II NR NR NR NR Wrist - Dig II 13 NR   R Ulnar - Digit V (Antidromic)      Wrist Dig V 2.71 3.48 14.5 23.4 Wrist - Dig V 14 52   R Radial - Anatomical snuff box (Forearm)      Forearm Wrist 1.69 2.31 23.4 15.6 Forearm - Wrist 10 59       Motor NCS      Nerve / Sites Muscle Latency Amplitude Amp % Duration Segments Distance Lat Diff Velocity     ms mV % ms  cm ms m/s   R Median - APB      Wrist APB 11.35 5.1 100 7.65 Wrist - APB 8        Elbow APB 15.38 5.2 102 7.77 Elbow - Wrist 20 4.02 50   R Ulnar - ADM      Wrist ADM 3.44 6.7 100 5.00 Wrist - ADM 8        B.Elbow ADM 6.81 5.7 85.5 5.46 B.Elbow - Wrist 19 3.38 56      A.Elbow ADM 8.90 5.0 75.7 5.92 A.Elbow - B.Elbow 11 2.08 53       EMG Summary Table     Spontaneous MUAP Recruitment   Muscle IA Fib PSW Fasc CRD Amp Dur. Poly Pattern   R. Deltoid N None None None None N N None N   R. Biceps brachii N None None None None N N None N   R. Triceps brachii N None None None None N N None N   R. Pronator teres N None None None None N N None N   R. First dorsal interosseous N None None None None N N None N   R. Abductor pollicis brevis N None None None None N N None N       Summary    The motor conduction test was performed on 2 nerve(s). The results were normal in 1 nerve(s): R Ulnar - ADM. Results outside the specified normal range were found in 1 nerve(s), as follows:  In the R Median - APB study  the take off latency result was  increased for Wrist stimulation    The sensory conduction test was performed on 3 nerve(s). The results were normal in 2 nerve(s): R Ulnar - Digit V (Antidromic), R Radial - Anatomical snuff box (Forearm). Results outside the specified normal range were found in 1 nerve(s), as follows:  In the R Median - Digit II (Antidromic) study  the response was considered absent for Wrist stimulation    The needle EMG study was normal in all 6 tested muscles: R. Deltoid, R. Biceps brachii, R. Triceps brachii, R. Pronator teres, R. First dorsal interosseous, R. Abductor pollicis brevis.    Impression:    Abnormal study     EMG and nerve conduction study of the RIGHT upper extremity revealed the following:      RIGHT severe median mononeuropathy at the wrist (carpal tunnel syndrome)     There was no electrodiagnostic evidence of cervical radiculopathy, plexopathy, peripheral polyneuropathy or myopathy in the right upper extremity.      PLAN: Recommended bracing of the involved wrist at night and f/u with referring provider for additional management.  ------------------------------  Junior Mercado MD

## 2025-03-24 DIAGNOSIS — Z00.00 ENCOUNTER FOR MEDICARE ANNUAL WELLNESS EXAM: ICD-10-CM

## 2025-04-28 ENCOUNTER — TELEPHONE (OUTPATIENT)
Dept: FAMILY MEDICINE | Facility: CLINIC | Age: 75
End: 2025-04-28
Payer: MEDICARE

## 2025-07-24 ENCOUNTER — OFFICE VISIT (OUTPATIENT)
Dept: FAMILY MEDICINE | Facility: CLINIC | Age: 75
End: 2025-07-24
Payer: MEDICARE

## 2025-07-24 VITALS
BODY MASS INDEX: 28.82 KG/M2 | OXYGEN SATURATION: 96 % | WEIGHT: 183.63 LBS | DIASTOLIC BLOOD PRESSURE: 74 MMHG | SYSTOLIC BLOOD PRESSURE: 128 MMHG | HEIGHT: 67 IN | TEMPERATURE: 98 F | HEART RATE: 52 BPM

## 2025-07-24 DIAGNOSIS — I10 ESSENTIAL HYPERTENSION: ICD-10-CM

## 2025-07-24 DIAGNOSIS — Z00.00 ENCOUNTER FOR MEDICARE ANNUAL WELLNESS EXAM: Primary | ICD-10-CM

## 2025-07-24 PROCEDURE — 99999 PR PBB SHADOW E&M-EST. PATIENT-LVL IV: CPT | Mod: PBBFAC,,, | Performed by: NURSE PRACTITIONER

## 2025-07-24 RX ORDER — CALCIUM CARBONATE/VITAMIN D3 500 MG-2.5
TABLET,CHEWABLE ORAL ONCE
COMMUNITY

## 2025-07-24 RX ORDER — MAGNESIUM 250 MG
TABLET ORAL ONCE
COMMUNITY

## 2025-07-24 NOTE — PROGRESS NOTES
"  Deisy Yeager presented for a  Medicare AWV and comprehensive Health Risk Assessment today. The following components were reviewed and updated:    Medical history  Family History  Social history  Allergies and Current Medications  Health Risk Assessment  Health Maintenance  Care Team         ** See Completed Assessments for Annual Wellness Visit within the encounter summary.**         The following assessments were completed:  Living Situation  CAGE  Depression Screening  Timed Get Up and Go  Whisper Test  Cognitive Function Screening  Nutrition Screening  ADL Screening  PAQ Screening    Clock in media   Opioid documentation:      Patient does not have a current opioid prescription.        Vitals:    07/24/25 1411   BP: 128/74   Pulse: (!) 52   Temp: 98.1 °F (36.7 °C)   TempSrc: Oral   SpO2: 96%   Weight: 83.3 kg (183 lb 10.3 oz)   Height: 5' 7" (1.702 m)     Body mass index is 28.76 kg/m².  Physical Exam  Constitutional:       Appearance: She is well-developed.   HENT:      Head: Normocephalic and atraumatic.      Nose: Nose normal.   Eyes:      General: Lids are normal.      Conjunctiva/sclera: Conjunctivae normal.   Cardiovascular:      Rate and Rhythm: Normal rate.   Pulmonary:      Effort: Pulmonary effort is normal.   Neurological:      Mental Status: She is alert and oriented to person, place, and time.   Psychiatric:         Speech: Speech normal.         Behavior: Behavior normal.               Diagnoses and health risks identified today and associated recommendations/orders:    1. Encounter for Medicare annual wellness exam  Discussed health maintenance guidelines appropriate for age.      - Referral to Enhanced Annual Wellness Visit (eAWV) W+1    2. Essential hypertension  Controlled, continue current medication regimen  Low salt diet  Increase physical activity  Followed by pcp        Provided Deisy with a 5-10 year written screening schedule and personal prevention plan. Recommendations were " developed using the USPSTF age appropriate recommendations. Education, counseling, and referrals were provided as needed. After Visit Summary printed and given to patient which includes a list of additional screenings\tests needed.    Follow up for One year for Annual Wellness Visit.    Mary Tse, NP      I offered to discuss advanced care planning, including how to pick a person who would make decisions for you if you were unable to make them for yourself, called a health care power of , and what kind of decisions you might make such as use of life sustaining treatments such as ventilators and tube feeding when faced with a life limiting illness recorded on a living will that they will need to know. (How you want to be cared for as you near the end of your natural life)     X Patient is interested in learning more about how to make advanced directives.  I provided them paperwork and offered to discuss this with them.

## 2025-07-24 NOTE — PATIENT INSTRUCTIONS
Counseling and Referral of Other Preventative  (Italic type indicates deductible and co-insurance are waived)    Patient Name: Deisy Yeager  Today's Date: 7/24/2025    Health Maintenance       Date Due Completion Date    Colorectal Cancer Screening Never done ---    Shingles Vaccine (1 of 2) Never done ---    RSV Vaccine (Age 60+ and Pregnant patients) (1 - Risk 60-74 years 1-dose series) Never done ---    COVID-19 Vaccine (3 - 2024-25 season) 09/01/2024 9/3/2021    Influenza Vaccine (1) 09/01/2025 11/3/2009    TETANUS VACCINE 05/07/2026 5/7/2016    Override on 6/18/2015: Done    Mammogram 10/03/2026 10/3/2024    DEXA Scan 10/03/2026 10/3/2024    Lipid Panel 10/01/2029 10/1/2024    Override on 3/7/2017: Done        No orders of the defined types were placed in this encounter.    The following information is provided to all patients.  This information is to help you find resources for any of the problems found today that may be affecting your health:                  Living healthy guide: www.[facility].[place].gov      Understanding Diabetes: www.diabetes.org      Eating healthy: www.cdc.gov/healthyweight      CDC home safety checklist: www.cdc.gov/steadi/patient.html      Agency on Aging: www.goea.[place].[facility]      Alcoholics anonymous (AA): www.aa.org      Physical Activity: www.javon.nih.gov/rt7pyxv      Tobacco use: www.quitwithusla.org

## 2025-08-01 DIAGNOSIS — I10 ESSENTIAL HYPERTENSION: ICD-10-CM

## 2025-08-01 RX ORDER — ATENOLOL 25 MG/1
25 TABLET ORAL DAILY
Qty: 90 TABLET | Refills: 1 | Status: SHIPPED | OUTPATIENT
Start: 2025-08-01

## 2025-08-01 NOTE — TELEPHONE ENCOUNTER
The patient's prescription has been approved and sent to   [id number] - [place], [place] - [address].  [address]  [place] [id number]  Phone: [phone] Fax: [phone]

## 2025-08-01 NOTE — TELEPHONE ENCOUNTER
----- Message from Piero Rojo sent at 8/1/2025  9:23 AM CDT -----  Pt requesting refills on atenolol please send to [facility] on [address]     Pt # [phone]

## 2025-08-21 DIAGNOSIS — M72.2 PLANTAR FASCIITIS, BILATERAL: ICD-10-CM

## 2025-08-21 RX ORDER — MELOXICAM 15 MG/1
15 TABLET ORAL DAILY
Qty: 90 TABLET | Refills: 1 | Status: SHIPPED | OUTPATIENT
Start: 2025-08-21

## 2025-08-21 RX ORDER — BENZONATATE 100 MG/1
100 CAPSULE ORAL 3 TIMES DAILY PRN
Qty: 30 CAPSULE | Refills: 0 | Status: SHIPPED | OUTPATIENT
Start: 2025-08-21 | End: 2025-08-31